# Patient Record
Sex: FEMALE | Race: WHITE
[De-identification: names, ages, dates, MRNs, and addresses within clinical notes are randomized per-mention and may not be internally consistent; named-entity substitution may affect disease eponyms.]

---

## 2018-12-29 ENCOUNTER — HOSPITAL ENCOUNTER (EMERGENCY)
Dept: HOSPITAL 25 - UCCORT | Age: 63
Discharge: HOME | End: 2018-12-29
Payer: COMMERCIAL

## 2018-12-29 VITALS — DIASTOLIC BLOOD PRESSURE: 94 MMHG | SYSTOLIC BLOOD PRESSURE: 129 MMHG

## 2018-12-29 DIAGNOSIS — J32.9: Primary | ICD-10-CM

## 2018-12-29 PROCEDURE — G0463 HOSPITAL OUTPT CLINIC VISIT: HCPCS

## 2018-12-29 PROCEDURE — 99212 OFFICE O/P EST SF 10 MIN: CPT

## 2018-12-29 NOTE — UC
Throat Pain/Nasal Nils HPI





- HPI Summary


HPI Summary: 





Congestion for about 5 days. Son was sick with similar symp[toms. Last 

sinusitis was years ago. No fever or dental pain. 





- History of Current Complaint


Chief Complaint: UCGeneralIllness


Stated Complaint: SINUS COMPLAINT


Time Seen by Provider: 12/29/18 12:01


Hx Obtained From: Patient


Hx Last Menstrual Period: 14 years ago.


Onset/Duration: Gradual Onset, Lasting Days, Still Present


Severity: Moderate


Pain Intensity: 0


Cough: None


Associated Signs & Symptoms: Positive: Hoarseness, Sinus Discomfort, Nasal 

Discharge.  Negative: Fever, Vomiting





- Allergies/Home Medications


Allergies/Adverse Reactions: 


 Allergies











Allergy/AdvReac Type Severity Reaction Status Date / Time


 


No Known Allergies Allergy   Verified 12/29/18 11:48














PMH/Surg Hx/FS Hx/Imm Hx


Previously Healthy: No - celiac


Cardiovascular History: Cardiac Disease


Other History Of: 


   Negative For: HIV, Hepatitis B, Hepatitis C





- Surgical History


Surgical History: Yes


Surgery Procedure, Year, and Place: COLON RESECTION 2007, CARPAL TUNNEL LEFT 

HAND 2010, THYROIDECTOMY 2002





- Family History


Known Family History: Positive: Non-Contributory





- Social History


Lives: With Family


Alcohol Use: Daily


Substance Use Type: None


Smoking Status (MU): Never Smoked Tobacco





Review of Systems


All Other Systems Reviewed And Are Negative: Yes


ENT: Positive: Sinus Congestion





Physical Exam


Triage Information Reviewed: Yes


Appearance: Well-Appearing


Vital Signs: 


 Initial Vital Signs











Temp  98.5 F   12/29/18 11:46


 


Pulse  78   12/29/18 11:46


 


Resp  16   12/29/18 11:46


 


BP  129/94   12/29/18 11:46


 


Pulse Ox  98   12/29/18 11:46











Vital Signs Reviewed: Yes


Eye Exam: Normal


Eyes: Positive: Conjunctiva Clear.  Negative: Conjunctiva Inflamed


ENT: Positive: Normal ENT inspection, Pharynx normal, Pharyngeal erythema, 

Nasal congestion, TM bulging, Hoarse voice, Uvula midline.  Negative: Nasal 

drainage, TM dull, TM red, Tonsillar swelling, Tonsillar exudate, Trismus, 

Muffled voice, Sinus tenderness


Neck: Positive: Supple, Nontender, No Lymphadenopathy


Respiratory: Positive: Chest non-tender, Lungs clear, Normal breath sounds, No 

respiratory distress, No accessory muscle use.  Negative: Respiratory distress, 

Decreased breath sounds, Accessory muscle use, Crackles, Rhonchi, Stridor


Cardiovascular: Positive: RRR, No Murmur, Pulses Normal


Abdomen Description: Positive: Nontender, No Organomegaly, Soft.  Negative: 

Distended, Guarding


Musculoskeletal: Positive: Strength Intact, ROM Intact, No Edema


Neurological: Positive: Alert, Muscle Tone Normal.  Negative: Fatigued


Psychological: Positive: Normal Response To Family


Skin: Negative: Rashes





Throat Pain/Nasal Course/Dx





- Differential Dx/Diagnosis


Provider Diagnosis: 


 Rhinosinusitis








Discharge





- Sign-Out/Discharge


Documenting (check all that apply): Patient Departure


All imaging exams completed and their final reports reviewed: No Studies





- Discharge Plan


Condition: Good


Disposition: HOME


Prescriptions: 


Amoxicillin PO (*) [Amoxicillin 500 MG CAP*] 500 mg PO TID #30 cap


Patient Education Materials:  Sinusitis (ED), Rhinosinusitis (ED)


Referrals: 


Daina Herrera MD [Primary Care Provider] - If Needed


Additional Instructions: 


start antibiotic on day 9 or 10 if there is no improvement or if there is a 

sudden sinus pain. Until they try flonase twice a day and nasal saline 

irrigation. 





- Billing Disposition and Condition


Condition: GOOD


Disposition: Home